# Patient Record
Sex: FEMALE | Race: WHITE
[De-identification: names, ages, dates, MRNs, and addresses within clinical notes are randomized per-mention and may not be internally consistent; named-entity substitution may affect disease eponyms.]

---

## 2021-02-01 NOTE — PCM.PREANE
Preanesthetic Assessment





- Procedure


Proposed Procedure: 


Continuous labor epidural








- Anesthesia/Transfusion/Family Hx


Anesthesia History: No Prior Anesthesia


Transfusion History: No Prior Transfusion(s)





- Review of Systems


General: No Symptoms


Pulmonary: No Symptoms


Cardiovascular: No Symptoms


Gastrointestinal: No Symptoms


Neurological: No Symptoms


Other: Reports: None





- Physical Assessment


Vital Signs: 





                                Last Vital Signs











Temp  98.6 F   21 10:15


 


Pulse  94   21 10:15


 


Resp  18   21 10:15


 


BP  129/92 H  21 10:15


 


Pulse Ox  99   21 10:15











Height: 1.57 m


Weight: 93.44 kg


ASA Class: 2


Mental Status: Alert & Oriented x3


Airway Class: Mallampati = 2


Dentition: Reports: Normal Dentition


Thyro-Mental Finger Breadths: 3


Mouth Opening Finger Breadths: 3


ROM/Head Extension: Full


Lungs: Clear to Auscultation, Normal Respiratory Effort


Cardiovascular: Regular Rate, Regular Rhythm





- Lab


Values: 





                             Laboratory Last Values











WBC  8.43 K/mm3 (3.98-10.04)   21  10:25    


 


RBC  4.08 M/mm3 (3.98-5.22)   21  10:25    


 


Hgb  13.4 gm/dl (11.2-15.7)   21  10:25    


 


Hct  39.1 % (34.1-44.9)   21  10:25    


 


MCV  95.8 fl (79.4-94.8)  H D 21  10:25    


 


MCH  32.8 pg (25.6-32.2)  H  21  10:25    


 


MCHC  34.3 g/dl (32.2-35.5)   21  10:25    


 


RDW Std Deviation  43.2 fL (36.4-46.3)   21  10:25    


 


Plt Count  282 K/mm3 (182-369)   21  10:25    


 


MPV  8.6 fl (9.4-12.3)  L  21  10:25    


 


SARS-CoV-2 RNA (VINNIE)  Negative  (NEGATIVE)   21  10:25    


 


Blood Type  A POSITIVE   21  10:25    


 


Gel Antibody Screen  Negative   21  10:25    














- Allergies


Allergies/Adverse Reactions: 


                                    Allergies











Allergy/AdvReac Type Severity Reaction Status Date / Time


 


No Known Allergies Allergy   Verified 21 10:17














- Acknowledgements


Anesthesia Type Planned: Epidural


Pt an Appropriate Candidate for the Planned Anesthesia: Yes


Alternatives and Risks of Anesthesia Discussed w Pt/Guardian: Yes


Pt/Guardian Understands and Agrees with Anesthesia Plan: Yes





PreAnesthesia Questionnaire


OB/GYN History: Reports: Pregnancy, Spontaneous 


Dermatologic History: Reports: Other (See Below) (atypical mole)





- Past Surgical History


HEENT Surgical History: Reports: Oral Surgery (tooth extraction)





- SUBSTANCE USE


Tobacco Use Status *Q: Never Tobacco User


Second Hand Smoke Exposure: No


Recreational Drug Use History: No





- HOME MEDS


Home Medications: 


                                    Home Meds





Omeprazole Magnesium [Prilosec Otc] 20 mg PO DAILY PRN 21 [History]


Pnv No.95/Ferrous Fum/Folic AC [Prenatal Tablet] 1 each PO ASDIRECTED 21 

[History]











- CURRENT (IN HOUSE) MEDS


Current Meds: 





                               Current Medications





Diphenhydramine HCl (Benadryl)  25 mg IVPUSH Q6H PRN


   PRN Reason: pruritis


Ephedrine Sulfate (Ephedrine Sulfate)  5 mg IVPUSH ASDIRECTED PRN


   PRN Reason: Hypotension


Fentanyl (Sublimaze)  100 mcg EPIDUR Q3H PRN


   PRN Reason: Pain


Fentanyl/Bupivacaine HCl (Fentanyl/Bupivacaine/Ns 2 Mcg-0.125% 100 Ml)  100 ml 

EPIDUR ASDIRECTED PRN


   PRN Reason: Pain


Ampicillin Sodium 1 gm/ Sodium (Chloride)  100 mls @ 200 mls/hr IV Q4H [facility]


   Last Admin: 21 14:30 Dose:  200 mls/hr


   Documented by: 


Oxytocin/Lactated Ringer's (Pitocin In Lr 10 Units/1,000 Ml)  10 unit in 1,000 

mls @ 500 mls/hr IV .CONTINUOUS FIONA


Lactated Ringer's (Ringers, Lactated)  1,000 mls @ 100 mls/hr IV ASDIRECTED [facility]


   Last Admin: 21 14:40 Dose:  500 mls/hr


   Documented by: 


Nalbuphine HCl (Nubain)  10 mg IVPUSH Q2H PRN


   PRN Reason: Pain


Ondansetron HCl (Zofran)  4 mg IVPUSH Q4H PRN


   PRN Reason: Nausea/Vomiting


Sodium Chloride (Saline Flush)  10 ml FLUSH ASDIRECTED PRN


   PRN Reason: Keep Vein Open





Discontinued Medications





Ampicillin Sodium 2 gm/ Sodium (Chloride)  100 mls @ 200 mls/hr IV ONETIME ONE


   Stop: 21 10:59


   Last Admin: 21 10:43 Dose:  200 mls/hr


   Documented by: 


Lactated Ringer's (Ringers, Lactated) Confirm Administered Dose 1,000 mls @ as 

directed .ROUTE .STK-MED ONE


   Stop: 21 10:25


   Last Admin: 21 15:04 Dose:  Not Given


   Documented by:

## 2021-02-01 NOTE — PCM.PNLD
Labor Progress Note





- VS & Meds


Vital Signs: 


                                Last Vital Signs











Temp  37.0 C   02/01/21 10:15


 


Pulse  94   02/01/21 10:15


 


Resp  18   02/01/21 10:15


 


BP  129/92 H  02/01/21 10:15


 


Pulse Ox  99   02/01/21 10:15











Active Medications: 


                               Current Medications





Diphenhydramine HCl (Benadryl)  25 mg IVPUSH Q6H PRN


   PRN Reason: pruritis


Ephedrine Sulfate (Ephedrine Sulfate)  5 mg IVPUSH ASDIRECTED PRN


   PRN Reason: Hypotension


Fentanyl (Sublimaze)  100 mcg EPIDUR Q3H PRN


   PRN Reason: Pain


   Last Admin: 02/01/21 15:39 Dose:  100 mcg


   Documented by: 


Fentanyl/Bupivacaine HCl (Fentanyl/Bupivacaine/Ns 2 Mcg-0.125% 100 Ml)  100 ml 

EPIDUR ASDIRECTED PRN


   PRN Reason: Pain


   Last Admin: 02/01/21 15:40 Dose:  100 ml


   Documented by: 


Ampicillin Sodium 1 gm/ Sodium (Chloride)  100 mls @ 200 mls/hr IV Q4H [facility]


   Last Admin: 02/01/21 18:22 Dose:  200 mls/hr


   Documented by: 


Oxytocin/Lactated Ringer's (Pitocin In Lr 10 Units/1,000 Ml)  10 unit in 1,000 

mls @ 500 mls/hr IV .CONTINUOUS FIONA


Lactated Ringer's (Ringers, Lactated)  1,000 mls @ 100 mls/hr IV ASDIRECTED [facility]


   Last Admin: 02/01/21 18:21 Dose:  100 mls/hr


   Documented by: 


Oxytocin/Lactated Ringer's (Pitocin In Lr 10 Units/1,000 Ml)  10 unit in 1,000 

mls @ 12 mls/hr IV TITRATE [facility]A; Protocol


   Last Titration: 02/01/21 19:08 Dose:  6 munits/min, 36 mls/hr


   Documented by: 


Nalbuphine HCl (Nubain)  10 mg IVPUSH Q2H PRN


   PRN Reason: Pain


Ondansetron HCl (Zofran)  4 mg IVPUSH Q4H PRN


   PRN Reason: Nausea/Vomiting


Sodium Chloride (Saline Flush)  10 ml FLUSH ASDIRECTED PRN


   PRN Reason: Keep Vein Open





Discontinued Medications





Ampicillin Sodium 2 gm/ Sodium (Chloride)  100 mls @ 200 mls/hr IV ONETIME ONE


   Stop: 02/01/21 10:59


   Last Admin: 02/01/21 10:43 Dose:  200 mls/hr


   Documented by: 


Lactated Ringer's (Ringers, Lactated) Confirm Administered Dose 1,000 mls @ as 

directed .ROUTE .STK-MED ONE


   Stop: 02/01/21 10:25


   Last Admin: 02/01/21 15:04 Dose:  Not Given


   Documented by: 











- Uterine Contractions


Uterine Monitoring Mode: External [place]


Contraction Intensity: Mild to Moderate





- Fetal Monitoring


Fetal Monitor Mode: External Ultrasound


Fetal Heart Rate (FHR) Baseline: 140


Fetal Heart Rate (FHR) Variability: Moderate (6-25 bmp)


Fetal Accelerations: Present, 15x15


Fetal Decelerations: Early, Late (rare), Variable


Fetal Strip Review: Category II





- Vaginal Exam


Dilation (cm): 4


Effacement (Percent): 85


Station: -2


Cervical Position: Midposition





- Labor Progress (Free Text)


Labor Progress: 





After patient's epidural was started on pitocin for augmentation.  This was at 

1630.  Currently at 6.  IUPC just placed to better facilitate augmentation.  

Continue Ampicillin for GBS positive status.

## 2021-02-01 NOTE — PCM.LDHP
L&D History of Present Illness





- General


Date of Service: 21


Admit Problem/Dx: 


                   Patient Status Order with Admit Dx/Problem





21 10:15


Patient Status [ADT] Routine 








                           Admission Diagnosis/Problem











Admission Diagnosis/Problem    Spontaneous rupture of membranes














Source of Information: Patient


History Limitations: Reports: No Limitations





- History of Present Illness


Introduction:: 





Patient is a 24 y/o  at 40 0/7 wks who presented to clinic today with 

irregular contractions and SROM with SVE.  Doing well otherwise 





- Related Data


Allergies/Adverse Reactions: 


                                    Allergies











Allergy/AdvReac Type Severity Reaction Status Date / Time


 


No Known Allergies Allergy   Verified 21 10:17











Home Medications: 


                                    Home Meds





Omeprazole Magnesium [Prilosec Otc] 20 mg PO DAILY PRN 21 [History]


Pnv No.95/Ferrous Fum/Folic AC [Prenatal Tablet] 1 each PO ASDIRECTED 21 

[History]











Past Medical History


OB/GYN History: Reports: Pregnancy, Spontaneous 


: 2


Para: 0


Dermatologic History: Reports: Other (See Below) (atypical mole)





- Past Surgical History


HEENT Surgical History: Reports: Oral Surgery (tooth extraction)





Social & Family History





- Tobacco Use


Tobacco Use Status *Q: Never Tobacco User





- Alcohol Use


Alcohol Use History: No





- Recreational Drug Use


Recreational Drug Use: No





H&P Review of Systems





- Review of Systems:


Review Of Systems: See Below


General: Reports: No Symptoms


Pulmonary: Reports: No Symptoms


Cardiovascular: Reports: No Symptoms


Gastrointestinal: Reports: Abdominal Pain


Genitourinary: Reports: No Symptoms


Musculoskeletal: Reports: No Symptoms


Psychiatric: Reports: No Symptoms


Neurological: Reports: No Symptoms





L&D Exam





- Exam


Exam: See Below





- OB Specific


Contraction Intensity: Mild


Fetal Movement: Active


Fetal Heart Tones: Present


Fetal Heart Tones per Min: 150


Fetal Heart Rate (FHR) Variability: Moderate (6-25 bmp)


Birth Presentation: Vertex





- Meehan Score


Meehan Score Cervix Position: Midposition


Meehan Score Consistency: Soft


Meehan Score Effacement: 51-70%


Meehan Score Dilation: 1-2 cm


Meehan Score Infant's Station: -2


Meehan Score Total: 7





- Exam


General: Alert, Oriented, Cooperative


Lungs: Clear to Auscultation, Normal Respiratory Effort


Cardiovascular: Regular Rate, Regular Rhythm


GI/Abdominal Exam: Soft, Non-Tender


Genitourinary: Normal external exam


Extremities: Normal Inspection


Skin: Warm, Dry, Intact





- Patient Data


Result Diagrams: 


                                 21 10:25








- Problem List


(1) 40 weeks gestation of pregnancy


SNOMED Code(s): 41522159


   ICD Code: Z3A.40 - 40 WEEKS GESTATION OF PREGNANCY   Status: Acute   Current 

Visit: Yes   





(2) GBS (group B Streptococcus carrier), +RV culture, currently pregnant


SNOMED Code(s): 9388757892706, 470614931, 3450644403450


   ICD Code: O99.820 - STREPTOCOCCUS B CARRIER STATE COMPLICATING PREGNANCY   

Status: Acute   Current Visit: Yes   





(3) SROM (spontaneous rupture of membranes)


SNOMED Code(s): 854664753


   ICD Code: CKS8420 -    Status: Acute   Current Visit: Yes   


Problem List Initiated/Reviewed/Updated: Yes


Orders Last 24hrs: 


                               Active Orders 24 hr











 Category Date Time Status


 


 Patient Status [ADT] Routine ADT  21 10:15 Ordered


 


 Activity as Tolerated [RC] PFP Care  21 10:15 Ordered


 


 Communication Order [RC] ASDIRECTED Care  21 10:15 Ordered


 


 Fetal Heart Tones [RC] ASDIRECTED Care  21 10:15 Ordered


 


 Fetal Non Stress Test [RC] PER UNIT ROUTINE Care  21 10:15 Ordered


 


 Notify Provider [RC] PFP Care  21 10:15 Ordered


 


 Notify Provider [RC] PRN Care  21 10:15 Ordered


 


 Peripheral IV Care [RC] .AS DIRECTED Care  21 10:15 Ordered


 


 Vital Signs [RC] PER UNIT ROUTINE Care  21 10:15 Ordered


 


 Regular Diet [DIET] Diet  21 Breakfast Ordered


 


 CBC W/O DIFF,HEMOGRAM [HEME] Stat Lab  21 10:15 Ordered


 


 CORONAVIRUS COVID-19 VINNEI [MOLEC] Stat Lab  21 10:16 Ordered


 


 RAPID PLASMA REAGIN,RPR [CHEM] Routine Lab  21 10:15 Ordered


 


 TYPE AND SCREEN [BBK] Stat Lab  21 10:15 Ordered


 


 Ampicillin 1 gm Med  21 10:15 Ordered





 Sodium Chloride 0.9% [Normal Saline] 100 ml   





 IV Q4H   


 


 Ampicillin 2 gm Med  21 10:15 Ordered





 Sodium Chloride 0.9% [Normal Saline] 100 ml   





 IV ONETIME   


 


 Lactated Ringers [Ringers, Lactated] 1,000 ml Med  21 10:15 Ordered





 IV ASDIRECTED   


 


 Nalbuphine [Nubain] Med  21 10:15 Ordered





 10 mg IVPUSH Q2H PRN   


 


 Ondansetron [Zofran] Med  21 10:15 Ordered





 4 mg IVPUSH Q4H PRN   


 


 Oxytocin/Lactated Ringers [Pitocin in LR 10 Units/1,000 Med  21 10:15 

Ordered





 ML]   





 10 unit in 1,000 ml IV .CONTINUOUS   


 


 Sodium Chloride 0.9% [Saline Flush] Med  21 10:15 Ordered





 10 ml FLUSH ASDIRECTED PRN   


 


 Electronic Fetal Heart Tones Ext w TOCO [WOMSER] Oth  21 10:15 Ordered





 Routine   


 


 Electronic Fetal Heart Tones Internal [WOMSER] Per Unit Oth  21 10:15 

Ordered





 Routine   


 


 Peripheral IV Insertion Adult [OM.PC] Routine Oth  21 10:15 Ordered


 


 Resuscitation Status Routine Resus Stat  21 10:15 Ordered











Assessment/Plan Comment:: 





* Labs to be done


* GBS positive, start Ampicillin


* Allow patient to labor on her own for a time.  If no regular contractions can 

  add in oxytocin for augmentation


* Pain management per patient preference 


* Anticipate

## 2021-02-01 NOTE — PCM.SN.2
- Free Text/Narrative


Note: 





2300





At 2145 patient with fever to 100.4.  Given Ampicillin, Gentamicin, and Tylenol.

 At that time RN assessment showed patient to be 6 and 0 station.  Was on 10 of 

pitocin.  Assessing patient again at  and noted to have a prolonged 

deceleration and several late decelerations.  Pitocin decreased to 4 by RN.  Po

sition change done with resolution.  Now just with early decelerations.  RN 

assessment at 224 showed patient to be 9 cm.  Repeat temperature now normal.  

Continue careful assessment and working towards .  Patient aware of plan 





Rosey Silveira MD

## 2021-02-02 NOTE — [id number]
Post Anesthesia Note





- EVALUATION WITHIN 48HRS OF ANESTHETIC


Vital Signs in Normal Range: Yes


Patient Participated in Evaluation: Yes


Respiratory Function Stable: Yes


Airway Patent: Yes


Cardiovascular Function Stable: Yes


Hydration Status Stable: Yes


Pain Control Satisfactory: Yes


Nausea and Vomiting Control Satisfactory: Yes


Mental Status Recovered: Yes


Vital Signs: 


                                Last Vital Signs











Temp  38.0 C   02/01/21 21:59


 


Pulse  94   02/01/21 10:15


 


Resp  18   02/01/21 10:15


 


BP  129/92 H  02/01/21 10:15


 


Pulse Ox  99   02/01/21 10:15














- COMMENTS/OBSERVATIONS


Free Text/Narrative:: 





no anesthesia complications noted

## 2021-02-02 NOTE — PCM.DEL
L & D Note





- General Info


Date of Service: 21





- Delivery Note


Labor: Augmented by Oxytocin


Delivery Outcome: Livebirth


Infant Delivery Method: Spontaneous Vaginal Delivery-Single


Infant Delivery Mode: Spontaneous


Birth Presentation: Right Occiput Anterior (ANURAG)


Nuchal Cord: Present (tight, reduced after deliveyr )


Anesthesia Type: Epidural


Amniotic Fluid Description: Clear


Episiotomy Type: None


Laceration: 2nd Degree, Perineal


Suture type: Vicryl


Suture size: 2-0


Placenta: Intact, Spontaneous


Cord: 3 Vessels


Estimated Blood Loss: 100


Resuscitation Needed: Yes


[place]: Bulb Syringe, Stimulated, Warmed, [place] Used, Warmer Used


Delivery Comments (Free Text/Narrative):: 





Patient found to be complete and began pushing.  With maternal pushing effort 

fetal head delivered from an ANURAG presentation.  Nuchal cord present, but tight 

and not reduced.  With gentle downward traction the fetal shoulders and body 

delivered.  Infant placed on maternal abdomen. Cord clamped and cut.  Cord blood

obtained.  Placenta allowed time to separate and expelled intact.  Inspection of

the perineum showed a 2nd degree laceration which was repaired with a 2-0 Vicryl

in the typical fashion. 





- General Info


Date of Service: 21





- Patient Data


Vitals - Most Recent: 


                                Last Vital Signs











Temp  38.0 C   21 21:59


 


Pulse  94   21 10:15


 


Resp  18   21 10:15


 


BP  129/92 H  21 10:15


 


Pulse Ox  99   21 10:15











Weight - Most Recent: 93.44 kg


I&O - Last 24 Hours: 


                                 Intake & Output











 21





 14:59 22:59 06:59


 


Intake Total  3300 


 


Balance  3300 














- Problem List & Annotations


(1) 40 weeks gestation of pregnancy


SNOMED Code(s): 03287768


   Code(s): Z3A.40 - 40 WEEKS GESTATION OF PREGNANCY   Status: Acute   Current 

Visit: Yes   





(2) GBS (group B Streptococcus carrier), +RV culture, currently pregnant


SNOMED Code(s): 1462946756676, 646990907, 9192032836927


   Code(s): O99.820 - STREPTOCOCCUS B CARRIER STATE COMPLICATING PREGNANCY   

Status: Acute   Current Visit: Yes   





(3) SROM (spontaneous rupture of membranes)


SNOMED Code(s): 366201840


   Code(s): FCU9339 -    Status: Acute   Current Visit: Yes   





(4) Chorioamnionitis


SNOMED Code(s): 78915383


   Code(s): O41.1290 - CHORIOAMNIONITIS, UNSP TRIMESTER, NOT APPLICABLE OR UNSP 

 Status: Acute   Current Visit: Yes   


Qualifiers: 


   Fetus number: single or unspecified fetus   Trimester: third trimester   

Qualified Code(s): O41.1230 - Chorioamnionitis, third trimester, not applicable 

or unspecified   





(5) Vaginal delivery


SNOMED Code(s): 910282386


   Code(s): O80 - ENCOUNTER FOR FULL-TERM UNCOMPLICATED DELIVERY   Status: Acute

  Current Visit: Yes   





- Problem List Review


Problem List Initiated/Reviewed/Updated: Yes





- My Orders


Last 24 Hours: 


My Active Orders





21 Breakfast


Regular Diet [DIET] 





21 10:15


Patient Status [ADT] Routine 


Activity as Tolerated [RC] PFP 


Communication Order [RC] ASDIRECTED 


Fetal Heart Tones [RC] ASDIRECTED 


Fetal Non Stress Test [RC] PER UNIT ROUTINE 


Notify Provider [RC] PFP 


Notify Provider [RC] PRN 


Peripheral IV Care [RC] .AS DIRECTED 


Vital Signs [RC] PER UNIT ROUTINE 


Lactated Ringers [Ringers, Lactated] 1,000 ml IV ASDIRECTED 


Nalbuphine [Nubain]   10 mg IVPUSH Q2H PRN 


Ondansetron [Zofran]   4 mg IVPUSH Q4H PRN 


Oxytocin/Lactated Ringers [Pitocin in LR 10 Units/1,000 ML] 10 unit in 1,000 ml 

IV .CONTINUOUS 


Sodium Chloride 0.9% [Saline Flush]   10 ml FLUSH ASDIRECTED PRN 


Electronic Fetal Heart Tones Ext w TOCO [WOMSER] Routine 


Electronic Fetal Heart Tones Internal [WOMSER] Per Unit Routine 


Peripheral IV Insertion Adult [OM.PC] Routine 


Resuscitation Status Routine 





21 16:30


Oxytocin/Lactated Ringers [Pitocin in LR 10 Units/1,000 ML] 10 unit in 1,000 ml 

IV TITRATE 





21 21:45


Ampicillin 2 gm   Sodium Chloride 0.9% [Normal Saline] 100 ml IV Q6H 














- Assessment


Assessment:: 





PPD#0 





- Plan


Plan:: 





* Routine cares


* Received Amp/Gent in labor.  No need for further antibiotics.  Monitor closely

  


* Breast feeding


* Discharge home in 1-2 days

## 2021-02-03 NOTE — PCM.PNPP
- General Info


Date of Service: 21


Functional Status: Reports: Pain Controlled, Tolerating Diet, Ambulating, 

Urinating





- Review of Systems


General: Reports: No Symptoms


Pulmonary: Reports: No Symptoms


Cardiovascular: Reports: No Symptoms


Gastrointestinal: Reports: No Symptoms


Genitourinary: Reports: No Symptoms


Musculoskeletal: Reports: No Symptoms


Neurological: Reports: No Symptoms





- General Info


Date of Service: 21





- Patient Data


Vital Signs - Most Recent: 


                                Last Vital Signs











Temp  36.4 C   21 03:11


 


Pulse  77   21 03:11


 


Resp  16   21 03:11


 


BP  117/84   21 03:11


 


Pulse Ox  96   21 03:11











Weight - Most Recent: 93.44 kg


Med Orders - Current: 


                               Current Medications





Acetaminophen (Tylenol)  650 mg PO Q4H PRN


   PRN Reason: mild pain or fever


   Last Admin: 21 20:28 Dose:  650 mg


   Documented by: 


Benzocaine/Menthol (Dermoplast Pain Relief Spray)  0 gm TOP ASDIRECTED PRN


   PRN Reason: Perineal Comfort Measure


   Last Admin: 21 02:56 Dose:  1 can


   Documented by: 


Docusate Sodium (Colace)  100 mg PO BID PRN


   PRN Reason: Constipation


Ibuprofen (Motrin)  600 mg PO Q4H PRN


   PRN Reason: Mild pain or fever


   Last Admin: 21 05:46 Dose:  600 mg


   Documented by: 


Witch Hazel (Tucks)  1 pad TOP ASDIRECTED PRN


   PRN Reason: Perineal Comfort Measure


   Last Admin: 21 02:56 Dose:  1 tub


   Documented by: 





Discontinued Medications





Acetaminophen (Tylenol)  975 mg PO NOW ONE


   Stop: 21 21:43


   Last Admin: 21 21:59 Dose:  975 mg


   Documented by: 


Diphenhydramine HCl (Benadryl)  25 mg IVPUSH Q6H PRN


   PRN Reason: pruritis


Ephedrine Sulfate (Ephedrine Sulfate)  5 mg IVPUSH ASDIRECTED PRN


   PRN Reason: Hypotension


Fentanyl (Sublimaze)  100 mcg EPIDUR Q3H PRN


   PRN Reason: Pain


   Last Admin: 21 15:39 Dose:  100 mcg


   Documented by: 


Fentanyl/Bupivacaine HCl (Fentanyl/Bupivacaine/Ns 2 Mcg-0.125% 100 Ml)  100 ml 

EPIDUR ASDIRECTED PRN


   PRN Reason: Pain


   Last Admin: 21 23:55 Dose:  100 ml


   Documented by: 


Ampicillin Sodium 2 gm/ Sodium (Chloride)  100 mls @ 200 mls/hr IV ONETIME ONE


   Stop: 21 10:59


   Last Admin: 21 10:43 Dose:  200 mls/hr


   Documented by: 


Ampicillin Sodium 1 gm/ Sodium (Chloride)  100 mls @ 200 mls/hr IV Q4H FIONA


   Last Admin: 21 18:22 Dose:  200 mls/hr


   Documented by: 


Oxytocin/Lactated Ringer's (Pitocin In Lr 10 Units/1,000 Ml)  10 unit in 1,000 

mls @ 500 mls/hr IV .CONTINUOUS FIONA


   Last Admin: 21 02:08 Dose:  500 mls/hr


   Documented by: 


Lactated Ringer's (Ringers, Lactated)  1,000 mls @ 100 mls/hr IV ASDIRECTED FIONA


   Last Admin: 21 22:04 Dose:  100 mls/hr


   Documented by: 


Lactated Ringer's (Ringers, Lactated) Confirm Administered Dose 1,000 mls @ as 

directed .ROUTE .STK-MED ONE


   Stop: 21 10:25


   Last Admin: 21 15:04 Dose:  Not Given


   Documented by: 


Oxytocin/Lactated Ringer's (Pitocin In Lr 10 Units/1,000 Ml)  10 unit in 1,000 

mls @ 12 mls/hr IV TITRATE FIONA; Protocol


   Last Titration: 21 22:50 Dose:  4 munits/min, 24 mls/hr


   Documented by: 


Gentamicin Sulfate 460 mg/ (Sodium Chloride)  111.5 mls @ 111.5 mls/hr IV 

ONETIME ONE


   Stop: 21 21:43


   Last Admin: 21 22:24 Dose:  111.5 mls/hr


   Documented by: 


Ampicillin Sodium 2 gm/ Sodium (Chloride)  100 mls @ 200 mls/hr IV Q6H FIONA


   Last Admin: 21 22:02 Dose:  200 mls/hr


   Documented by: 


Lidocaine/Epinephrine (Xylocaine-Mpf 1.5% W/Epinephrine 1:200,000)  5 ml .ROUTE 

.STK-MED ONE


   Stop: 21 00:01


Nalbuphine HCl (Nubain)  10 mg IVPUSH Q2H PRN


   PRN Reason: Pain


Ondansetron HCl (Zofran)  4 mg IVPUSH Q4H PRN


   PRN Reason: Nausea/Vomiting


Sodium Chloride (Saline Flush)  10 ml FLUSH ASDIRECTED PRN


   PRN Reason: Keep Vein Open











- Infant Interaction


Infant Disposition, Postpartum: [place] in Room with Family


Infant Interaction: Holding Infant


Infant Feeding: Attempted Breastfeeding; Nursed Fair/Poor


Support Person: 





- Postpartum Recovery Exam


Fundal Tone: Firm


Fundal Level: 1 Fingerbreadths Below Umbilicus


Fundal Placement: Midline


Lochia Amount: Small


Lochia Color: Rubra/Red


Perineum Description: Other (see below)


Other Perinuem Description: 2nd degree with repair


Episiotomy/Laceration: Approximated


Bladder Status: Voiding


Urinary Elimination: Voided





- Exam


General: Alert, Oriented, Cooperative


GI/Abdominal Exam: Soft, Non-Tender


Extremities: Normal Inspection


Skin: Warm, Dry, Intact





- Problem List & Annotations


(1) 40 weeks gestation of pregnancy


SNOMED Code(s): 69423219


   Code(s): Z3A.40 - 40 WEEKS GESTATION OF PREGNANCY   Status: Acute   Current 

Visit: Yes   





(2) GBS (group B Streptococcus carrier), +RV culture, currently pregnant


SNOMED Code(s): 3401370387284, 377359093, 8840851377947


   Code(s): O99.820 - STREPTOCOCCUS B CARRIER STATE COMPLICATING PREGNANCY   

Status: Acute   Current Visit: Yes   





(3) SROM (spontaneous rupture of membranes)


SNOMED Code(s): 593177121


   Code(s): WJN7705 -    Status: Acute   Current Visit: Yes   





(4) Chorioamnionitis


SNOMED Code(s): 75109293


   Code(s): O41.1290 - CHORIOAMNIONITIS, UNSP TRIMESTER, NOT APPLICABLE OR UNSP 

 Status: Acute   Current Visit: Yes   


Qualifiers: 


   Fetus number: single or unspecified fetus   Trimester: third trimester   

Qualified Code(s): O41.1230 - Chorioamnionitis, third trimester, not applicable 

or unspecified   





(5) Vaginal delivery


SNOMED Code(s): 559090194


   Code(s): O80 - ENCOUNTER FOR FULL-TERM UNCOMPLICATED DELIVERY   Status: Acute

  Current Visit: Yes   





- Problem List Review


Problem List Initiated/Reviewed/Updated: Yes





- My Orders


Last 24 Hours: 


My Active Orders





21 Breakfast


Regular Diet [DIET] 





21 01:58


Heat Therapy [OM.PC] PRN 














- Assessment


Assessment:: 





PPD#1





- Plan


Plan:: 





* Routine cares


* Breast feeding


* Discharge home today vs tomorrow pending Pediatric input

## 2021-02-03 NOTE — PCM.DCSUM1
**Discharge Summary





- Discharge Data


Discharge Date: 21


Discharge Disposition: Home, Self-Care 01


Condition: Good





- Referral to Home Health


Primary Care Physician: 


Rosey Silveira MD








- Discharge Diagnosis/Problem(s)


(1) 40 weeks gestation of pregnancy


SNOMED Code(s): 06530157


   ICD Code: Z3A.40 - 40 WEEKS GESTATION OF PREGNANCY   Status: Acute   Current 

Visit: Yes   





(2) GBS (group B Streptococcus carrier), +RV culture, currently pregnant


SNOMED Code(s): 9196302449941, 704440774, 1453256129192


   ICD Code: O99.820 - STREPTOCOCCUS B CARRIER STATE COMPLICATING PREGNANCY   

Status: Acute   Current Visit: Yes   





(3) SROM (spontaneous rupture of membranes)


SNOMED Code(s): 432531774


   ICD Code: KCP8265 -    Status: Acute   Current Visit: Yes   





(4) Chorioamnionitis


SNOMED Code(s): 82236015


   ICD Code: O41.1290 - CHORIOAMNIONITIS, UNSP TRIMESTER, NOT APPLICABLE OR UNSP

  Status: Acute   Current Visit: Yes   


Qualifiers: 


   Fetus number: single or unspecified fetus   Trimester: third trimester   

Qualified Code(s): O41.1230 - Chorioamnionitis, third trimester, not applicable 

or unspecified   





(5) Vaginal delivery


SNOMED Code(s): 569922706


   ICD Code: O80 - ENCOUNTER FOR FULL-TERM UNCOMPLICATED DELIVERY   Status: 

Acute   Current Visit: Yes   





- Patient Summary/Data


Complications: None


Consults: 


None


Recommended Follow-up Testing/Procedures: 


Follow up in 3 weeks for postpartum check 


Hospital Course: 


26 y/o  at 40 0/7 wks admitted with SROM.  Augmented with pitocin.  

Progressed well to complete dilation.  Did develop a fever/chorioamnionitis in 

labor.  Treated with Amp/Gent.  Underwent uncomplicated .  See delivery note.

 Postpartum did well and was discharged home on PPD#1





- Patient Instructions


Diet: Regular Diet as Tolerated


Activity: As Tolerated


Activity, Other: Pelvic rest for 6 weeks


Driving: May Drive Today


Showering/Bathing: May Shower


Showering/Bathing, Other: May Bathe


Notify Provider of: Fever, Increased Pain, Swelling and Redness, Drainage, 

Nausea and/or Vomiting





- Discharge Plan


*PRESCRIPTION DRUG MONITORING PROGRAM REVIEWED*: No


*COPY OF PRESCRIPTION DRUG MONITORING REPORT IN PATIENT JEANNIE: No


Home Medications: 


                                    Home Meds





Pnv No.95/Ferrous Fum/Folic AC [Prenatal Tablet] 1 each PO ASDIRECTED 21 

[History]


Docusate Sodium [Colace] 100 mg PO BID PRN  cap 21 [Rx]


Ibuprofen [Motrin] 600 mg PO Q4H PRN  tablet 21 [Rx]








Patient Handouts:  Postpartum Care After Vaginal Delivery


Referrals: 


Rosey Silveira MD [Primary Care Provider] -  (3 weeks for postpartum check)





- Discharge Summary/Plan Comment


DC Time >30 min.: No





- Patient Data


Vitals - Most Recent: 


                                Last Vital Signs











Temp  36.4 C   21 03:11


 


Pulse  76   21 08:09


 


Resp  14   21 08:09


 


BP  117/84   21 03:11


 


Pulse Ox  98   21 08:09











Weight - Most Recent: 93.44 kg


I&O - Last 24 hours: 


                                 Intake & Output











 21





 22:59 06:59 14:59


 


Intake Total   300


 


Balance   300











Med Orders - Current: 


                               Current Medications





Acetaminophen (Tylenol)  650 mg PO Q4H PRN


   PRN Reason: mild pain or fever


   Last Admin: 21 20:28 Dose:  650 mg


   Documented by: 


Benzocaine/Menthol (Dermoplast Pain Relief Spray)  0 gm TOP ASDIRECTED PRN


   PRN Reason: Perineal Comfort Measure


   Last Admin: 21 02:56 Dose:  1 can


   Documented by: 


Docusate Sodium (Colace)  100 mg PO BID PRN


   PRN Reason: Constipation


Ibuprofen (Motrin)  600 mg PO Q4H PRN


   PRN Reason: Mild pain or fever


   Last Admin: 21 05:46 Dose:  600 mg


   Documented by: 


Witch Hazel (Tucks)  1 pad TOP ASDIRECTED PRN


   PRN Reason: Perineal Comfort Measure


   Last Admin: 21 02:56 Dose:  1 tub


   Documented by: 





Discontinued Medications





Acetaminophen (Tylenol)  975 mg PO NOW ONE


   Stop: 21 21:43


   Last Admin: 21 21:59 Dose:  975 mg


   Documented by: 


Diphenhydramine HCl (Benadryl)  25 mg IVPUSH Q6H PRN


   PRN Reason: pruritis


Ephedrine Sulfate (Ephedrine Sulfate)  5 mg IVPUSH ASDIRECTED PRN


   PRN Reason: Hypotension


Fentanyl (Sublimaze)  100 mcg EPIDUR Q3H PRN


   PRN Reason: Pain


   Last Admin: 21 15:39 Dose:  100 mcg


   Documented by: 


Fentanyl/Bupivacaine HCl (Fentanyl/Bupivacaine/Ns 2 Mcg-0.125% 100 Ml)  100 ml 

EPIDUR ASDIRECTED PRN


   PRN Reason: Pain


   Last Admin: 21 23:55 Dose:  100 ml


   Documented by: 


Ampicillin Sodium 2 gm/ Sodium (Chloride)  100 mls @ 200 mls/hr IV ONETIME ONE


   Stop: 21 10:59


   Last Admin: 21 10:43 Dose:  200 mls/hr


   Documented by: 


Ampicillin Sodium 1 gm/ Sodium (Chloride)  100 mls @ 200 mls/hr IV Q4H FIONA


   Last Admin: 21 18:22 Dose:  200 mls/hr


   Documented by: 


Oxytocin/Lactated Ringer's (Pitocin In Lr 10 Units/1,000 Ml)  10 unit in 1,000 

mls @ 500 mls/hr IV .CONTINUOUS FIONA


   Last Admin: 21 02:08 Dose:  500 mls/hr


   Documented by: 


Lactated Ringer's (Ringers, Lactated)  1,000 mls @ 100 mls/hr IV ASDIRECTED FIONA


   Last Admin: 21 22:04 Dose:  100 mls/hr


   Documented by: 


Lactated Ringer's (Ringers, Lactated) Confirm Administered Dose 1,000 mls @ as 

directed .ROUTE .STK-MED ONE


   Stop: 21 10:25


   Last Admin: 21 15:04 Dose:  Not Given


   Documented by: 


Oxytocin/Lactated Ringer's (Pitocin In Lr 10 Units/1,000 Ml)  10 unit in 1,000 

mls @ 12 mls/hr IV TITRATE FIONA; Protocol


   Last Titration: 21 22:50 Dose:  4 munits/min, 24 mls/hr


   Documented by: 


Gentamicin Sulfate 460 mg/ (Sodium Chloride)  111.5 mls @ 111.5 mls/hr IV ONE

TIME ONE


   Stop: 21 21:43


   Last Admin: 21 22:24 Dose:  111.5 mls/hr


   Documented by: 


Ampicillin Sodium 2 gm/ Sodium (Chloride)  100 mls @ 200 mls/hr IV Q6H FIONA


   Last Admin: 21 22:02 Dose:  200 mls/hr


   Documented by: 


Lidocaine/Epinephrine (Xylocaine-Mpf 1.5% W/Epinephrine 1:200,000)  5 ml .ROUTE 

.[facility]-MED ONE


   Stop: 21 00:01


Nalbuphine HCl (Nubain)  10 mg IVPUSH Q2H PRN


   PRN Reason: Pain


Ondansetron HCl (Zofran)  4 mg IVPUSH Q4H PRN


   PRN Reason: Nausea/Vomiting


Sodium Chloride (Saline Flush)  10 ml FLUSH ASDIRECTED PRN


   PRN Reason: Keep Vein Open

## 2023-03-30 ENCOUNTER — HOSPITAL ENCOUNTER (INPATIENT)
Dept: HOSPITAL 41 - JD.OBCHECK | Age: 28
LOS: 2 days | Discharge: HOME | DRG: 560 | End: 2023-04-01
Attending: OBSTETRICS & GYNECOLOGY | Admitting: OBSTETRICS & GYNECOLOGY
Payer: COMMERCIAL

## 2023-03-30 DIAGNOSIS — Z3A.39: ICD-10-CM

## 2023-03-31 PROCEDURE — 3E033VJ INTRODUCTION OF OTHER HORMONE INTO PERIPHERAL VEIN, PERCUTANEOUS APPROACH: ICD-10-PCS | Performed by: OBSTETRICS & GYNECOLOGY

## 2023-03-31 PROCEDURE — 0KQM0ZZ REPAIR PERINEUM MUSCLE, OPEN APPROACH: ICD-10-PCS | Performed by: OBSTETRICS & GYNECOLOGY

## 2023-03-31 PROCEDURE — 3E0R3BZ INTRODUCTION OF ANESTHETIC AGENT INTO SPINAL CANAL, PERCUTANEOUS APPROACH: ICD-10-PCS | Performed by: OBSTETRICS & GYNECOLOGY

## 2023-03-31 PROCEDURE — 10907ZC DRAINAGE OF AMNIOTIC FLUID, THERAPEUTIC FROM PRODUCTS OF CONCEPTION, VIA NATURAL OR ARTIFICIAL OPENING: ICD-10-PCS | Performed by: OBSTETRICS & GYNECOLOGY

## 2023-03-31 PROCEDURE — 00HU33Z INSERTION OF INFUSION DEVICE INTO SPINAL CANAL, PERCUTANEOUS APPROACH: ICD-10-PCS | Performed by: OBSTETRICS & GYNECOLOGY
